# Patient Record
Sex: FEMALE | URBAN - METROPOLITAN AREA
[De-identification: names, ages, dates, MRNs, and addresses within clinical notes are randomized per-mention and may not be internally consistent; named-entity substitution may affect disease eponyms.]

---

## 2022-12-07 ENCOUNTER — APPOINTMENT (RX ONLY)
Dept: URBAN - METROPOLITAN AREA CLINIC 95 | Facility: CLINIC | Age: 42
Setting detail: DERMATOLOGY
End: 2022-12-07

## 2022-12-07 DIAGNOSIS — L81.1 CHLOASMA: ICD-10-CM | Status: INADEQUATELY CONTROLLED

## 2022-12-07 DIAGNOSIS — L20.89 OTHER ATOPIC DERMATITIS: ICD-10-CM | Status: INADEQUATELY CONTROLLED

## 2022-12-07 PROBLEM — L20.84 INTRINSIC (ALLERGIC) ECZEMA: Status: ACTIVE | Noted: 2022-12-07

## 2022-12-07 PROCEDURE — ? PRESCRIPTION

## 2022-12-07 PROCEDURE — ? COUNSELING

## 2022-12-07 PROCEDURE — 99204 OFFICE O/P NEW MOD 45 MIN: CPT

## 2022-12-07 PROCEDURE — ? PRESCRIPTION MEDICATION MANAGEMENT

## 2022-12-07 RX ORDER — CLOBETASOL PROPIONATE 0.5 MG/G
OINTMENT TOPICAL
Qty: 60 | Refills: 3 | Status: ERX | COMMUNITY
Start: 2022-12-07

## 2022-12-07 RX ADMIN — CLOBETASOL PROPIONATE: 0.5 OINTMENT TOPICAL at 00:00

## 2022-12-07 ASSESSMENT — LOCATION SIMPLE DESCRIPTION DERM
LOCATION SIMPLE: LEFT CHEEK
LOCATION SIMPLE: RIGHT WRIST
LOCATION SIMPLE: RIGHT CHEEK

## 2022-12-07 ASSESSMENT — LOCATION DETAILED DESCRIPTION DERM
LOCATION DETAILED: RIGHT INFERIOR CENTRAL MALAR CHEEK
LOCATION DETAILED: RIGHT VENTRAL WRIST
LOCATION DETAILED: LEFT INFERIOR CENTRAL MALAR CHEEK

## 2022-12-07 ASSESSMENT — LOCATION ZONE DERM
LOCATION ZONE: FACE
LOCATION ZONE: ARM

## 2022-12-07 NOTE — PROCEDURE: PRESCRIPTION MEDICATION MANAGEMENT
Detail Level: Zone
Render In Strict Bullet Format?: No
Initiate Treatment: BLD QHS
Initiate Treatment: Clobetasol ointment BID
Plan: OTC Vaseline or Coconut oil for moisturizer

## 2023-11-13 ENCOUNTER — APPOINTMENT (RX ONLY)
Dept: URBAN - METROPOLITAN AREA CLINIC 167 | Facility: CLINIC | Age: 43
Setting detail: DERMATOLOGY
End: 2023-11-13

## 2023-11-13 DIAGNOSIS — L65.9 NONSCARRING HAIR LOSS, UNSPECIFIED: ICD-10-CM

## 2023-11-13 DIAGNOSIS — Z41.9 ENCOUNTER FOR PROCEDURE FOR PURPOSES OTHER THAN REMEDYING HEALTH STATE, UNSPECIFIED: ICD-10-CM

## 2023-11-13 DIAGNOSIS — L81.1 CHLOASMA: ICD-10-CM

## 2023-11-13 PROCEDURE — ? COSMETIC CONSULTATION - MICRO-NEEDLING

## 2023-11-13 PROCEDURE — ? ADDITIONAL NOTES

## 2023-11-13 PROCEDURE — ? PRODUCT LINE (REVISION)

## 2023-11-13 PROCEDURE — ? PRESCRIPTION

## 2023-11-13 PROCEDURE — ? ORDER TESTS

## 2023-11-13 PROCEDURE — ? COSMETIC QUOTE

## 2023-11-13 PROCEDURE — ? COUNSELING

## 2023-11-13 PROCEDURE — 99204 OFFICE O/P NEW MOD 45 MIN: CPT

## 2023-11-13 PROCEDURE — ? COSMETIC CONSULTATION: CHEMICAL PEELS

## 2023-11-13 PROCEDURE — ? PRESCRIPTION MEDICATION MANAGEMENT

## 2023-11-13 RX ORDER — PHARMACY COMPOUNDING ACCESSORY
1 EACH MISCELLANEOUS QPM
Qty: 30 | Refills: 2 | Status: ERX | COMMUNITY
Start: 2023-11-13

## 2023-11-13 RX ADMIN — Medication 1: at 00:00

## 2023-11-13 ASSESSMENT — LOCATION SIMPLE DESCRIPTION DERM
LOCATION SIMPLE: LEFT CHEEK
LOCATION SIMPLE: RIGHT CHEEK
LOCATION SIMPLE: RIGHT FOREHEAD

## 2023-11-13 ASSESSMENT — LOCATION ZONE DERM: LOCATION ZONE: FACE

## 2023-11-13 ASSESSMENT — LOCATION DETAILED DESCRIPTION DERM
LOCATION DETAILED: LEFT CENTRAL MALAR CHEEK
LOCATION DETAILED: RIGHT MEDIAL FOREHEAD
LOCATION DETAILED: RIGHT INFERIOR CENTRAL MALAR CHEEK

## 2023-11-13 ASSESSMENT — SEVERITY ASSESSMENT: SEVERITY: MODERATE, MODERATELY DARKER THAN THE SURROUNDING NORMAL SKIN

## 2023-11-13 NOTE — PROCEDURE: PRESCRIPTION MEDICATION MANAGEMENT
Plan: Blood work order given to pt today
Render In Strict Bullet Format?: No
Initiate Treatment: OTC 5% topical rogaine\\n\\nProscriptix shampoo and conditioner
Detail Level: Zone
Initiate Treatment: Delightful Cream - Hydroquinone USP 8%, Kojic Acid USP 2%, Vit C USP 2.5%, Tretinoin USP 0.05%, Hydrocortisone USP 1%, Hyaluronic Acid EXCP 0.1%

## 2023-11-13 NOTE — PROCEDURE: COSMETIC QUOTE
Injectable 4 Percentage Discount: 0
Face Procedure 8 Free Text Discount (In Dollars- Use Only Numbers And Decimals): 0.00
Facility Fee Units (Optional): 1
Apply Anesthesia Fee: No
Face Procedure 1 Units: 2
Face Procedure 1 Percentage Discount: 10
Face Procedure 1 Price/Unit (In Dollars- Use Only Numbers And Decimals): 379
Detail Level: Zone
Include Sales Tax On Implants: Yes
Face Procedure 2 Units: 3
Face Procedure 2: VI Precision Plus peels
Face Procedure 1: microneedling
Face Procedure 2 Price/Unit (In Dollars- Use Only Numbers And Decimals): 429

## 2023-11-13 NOTE — PROCEDURE: PRODUCT LINE (REVISION)
Assigning Risk Information: Per AMA, level of risk is based upon consequences of the problem(s) addressed at the encounter when appropriately treated. Risk also includes medical decision making related to the need to initiate or forego further testing, treatment and/or hospitalization. Over the counter medication are assigned a risk level of low. Prescription medication management is assigned a risk level of moderate.
Product 36 Price (In Dollars - Numeric Only, No Special Characters Or $): 0.00
Product 46 Units: 0
Risk Of Complication Category: No MDM
Product 2 Units: 1
Product 2 Application Directions: After cleansing skin each morning and night, pat skin dry with a towel. Once skin has been dried apply Vitamin C Correction cream
Allow Plan To Count Towards E/M Coding: Yes
Name Of Product 3: intellishade revox line relaxer
Product 3 Price (In Dollars - Numeric Only, No Special Characters Or $): 160.82
Product 4 Price (In Dollars - Numeric Only, No Special Characters Or $): 85.20
Send Charges To Patient Encounter: No
Name Of Product 1: Papaya enzyme cleanser
Product 4 Application Directions: each morning after cleansing and application of Vitamin C Correction cream apply intellishade truphysical
Product 3 Application Directions: After cleansing each morning and evening apply revox line relaxer onto regions with fine lines and wrinkles, such as forehead, between the eye brows, around the eays, and smile lines.
Product 1 Price (In Dollars - Numeric Only, No Special Characters Or $): 42.60
Product 1 Application Directions: each morning and evening use papaya enzyme cleanser to gently cleanse and exfoliate skin.
Name Of Product 2: Vitmamin C Correction cream
Name Of Product 4: Intellishade Trusphysical faily moisturizer
Detail Level: Zone
Product 2 Price (In Dollars - Numeric Only, No Special Characters Or $): 181.05

## 2023-11-13 NOTE — PROCEDURE: ADDITIONAL NOTES
Render Risk Assessment In Note?: no
Additional Notes: The patient is present due to concerns of melasma, and stated that she has suffered from melasma for several years. Upon evaluation of her concerns I informed her that it would be in her best interest to initiate the following treatment plan:\\n\\n1. The patient was advised to use pharmacy compound medication from [facility] for 4 weeks prior to receiving cosmetic treatment. The patient was informed that this would prepare her skin for optimal treatment results following microneedling treatment.\\n\\n2. I informed the patient that following her 4 weeks of prescription use, it would be in her best interest to pursue a microneedling treatment to aid in breaking apart dermal melasma and creating an ideal environment for change by strengthening her skin. I informed her that this is an excellent treatment approach to prepare her skin for medium depth chemical peels. The patient was quoted $379/microneedling treatment and I advised her to begin treatment journey with microneedling then alternate with medium depth chemical peels on a monthly basis. \\n\\n3. I informed the patient that our medium depth chemical peel VI Precision Plus would be the most effective peel option to address her concerns of melasma. The patient and I reviewed peel composition and I reviewed the benefits of the following ingredients: retinoic acid, salicylic acid, trichloroacetic acid, l-ascorbic acid, phenol, hydroquinone, and kojic acid. The patient was advised to receive 4 VI Precision Plus peels for treatment of her melasma, and was advised to alternate between microneedling and VI Peels on a monthly basis. $429/VI Precision Plus peel.\\n\\nI advised that she pursue a minimum of 2 microneedling treatments and 3 VI Precision Plus peels.
Detail Level: Simple

## 2023-11-13 NOTE — PROCEDURE: COUNSELING
Detail Level: Detailed
Sunscreen Recommendations: Recommended at least 50 SPF, reapply every 2 hours